# Patient Record
Sex: MALE | Race: WHITE | NOT HISPANIC OR LATINO | Employment: FULL TIME | URBAN - METROPOLITAN AREA
[De-identification: names, ages, dates, MRNs, and addresses within clinical notes are randomized per-mention and may not be internally consistent; named-entity substitution may affect disease eponyms.]

---

## 2017-01-09 ENCOUNTER — ALLSCRIPTS OFFICE VISIT (OUTPATIENT)
Dept: OTHER | Facility: OTHER | Age: 62
End: 2017-01-09

## 2017-01-13 ENCOUNTER — GENERIC CONVERSION - ENCOUNTER (OUTPATIENT)
Dept: OTHER | Facility: OTHER | Age: 62
End: 2017-01-13

## 2017-02-01 DIAGNOSIS — R91.1 SOLITARY PULMONARY NODULE: ICD-10-CM

## 2017-03-13 ENCOUNTER — ALLSCRIPTS OFFICE VISIT (OUTPATIENT)
Dept: OTHER | Facility: OTHER | Age: 62
End: 2017-03-13

## 2017-08-07 ENCOUNTER — ALLSCRIPTS OFFICE VISIT (OUTPATIENT)
Dept: OTHER | Facility: OTHER | Age: 62
End: 2017-08-07

## 2017-08-07 DIAGNOSIS — I10 ESSENTIAL (PRIMARY) HYPERTENSION: ICD-10-CM

## 2017-08-14 ENCOUNTER — HOSPITAL ENCOUNTER (OUTPATIENT)
Dept: CT IMAGING | Facility: CLINIC | Age: 62
Discharge: HOME | End: 2017-08-14
Payer: COMMERCIAL

## 2017-08-14 DIAGNOSIS — R91.1 SOLITARY PULMONARY NODULE: ICD-10-CM

## 2017-08-14 PROCEDURE — 71250 CT THORAX DX C-: CPT

## 2017-08-19 ENCOUNTER — LAB CONVERSION - ENCOUNTER (OUTPATIENT)
Dept: OTHER | Facility: OTHER | Age: 62
End: 2017-08-19

## 2017-08-19 LAB
A/G RATIO (HISTORICAL): 1.4 (CALC) (ref 1–2.5)
A/G RATIO (HISTORICAL): 1.4 (CALC) (ref 1–2.5)
ALBUMIN SERPL BCP-MCNC: 4.1 G/DL (ref 3.6–5.1)
ALBUMIN SERPL BCP-MCNC: 4.1 G/DL (ref 3.6–5.1)
ALP SERPL-CCNC: 73 U/L (ref 40–115)
ALP SERPL-CCNC: 73 U/L (ref 40–115)
ALT SERPL W P-5'-P-CCNC: 12 U/L (ref 9–46)
ALT SERPL W P-5'-P-CCNC: 12 U/L (ref 9–46)
AST SERPL W P-5'-P-CCNC: 18 U/L (ref 10–35)
AST SERPL W P-5'-P-CCNC: 18 U/L (ref 10–35)
BASOPHILS # BLD AUTO: 0.6 %
BASOPHILS # BLD AUTO: 28 CELLS/UL (ref 0–200)
BILIRUB SERPL-MCNC: 1.5 MG/DL (ref 0.2–1.2)
BILIRUB SERPL-MCNC: 1.5 MG/DL (ref 0.2–1.2)
BILIRUBIN DIRECT (HISTORICAL): 0.3 MG/DL
BUN SERPL-MCNC: 25 MG/DL (ref 7–25)
BUN/CREA RATIO (HISTORICAL): ABNORMAL (CALC) (ref 6–22)
CALCIUM SERPL-MCNC: 9.5 MG/DL (ref 8.6–10.3)
CHLORIDE SERPL-SCNC: 96 MMOL/L (ref 98–110)
CHOLEST SERPL-MCNC: 164 MG/DL (ref 125–200)
CHOLEST/HDLC SERPL: 3.2 (CALC)
CO2 SERPL-SCNC: 35 MMOL/L (ref 20–31)
CREAT SERPL-MCNC: 0.9 MG/DL (ref 0.7–1.25)
DEPRECATED RDW RBC AUTO: 12.1 % (ref 11–15)
EGFR AFRICAN AMERICAN (HISTORICAL): 107 ML/MIN/1.73M2
EGFR-AMERICAN CALC (HISTORICAL): 93 ML/MIN/1.73M2
EOSINOPHIL # BLD AUTO: 282 CELLS/UL (ref 15–500)
EOSINOPHIL # BLD AUTO: 6 %
GAMMA GLOBULIN (HISTORICAL): 3 G/DL (CALC) (ref 1.9–3.7)
GAMMA GLOBULIN (HISTORICAL): 3 G/DL (CALC) (ref 1.9–3.7)
GLUCOSE (HISTORICAL): 94 MG/DL (ref 65–99)
HCT VFR BLD AUTO: 43.7 % (ref 38.5–50)
HDLC SERPL-MCNC: 52 MG/DL
HGB BLD-MCNC: 14.8 G/DL (ref 13.2–17.1)
INDIRECT BILIRUBIN (HISTORICAL): 1.2 MG/DL (CALC) (ref 0.2–1.2)
LDL CHOLESTEROL (HISTORICAL): 96 MG/DL (CALC)
LYMPHOCYTES # BLD AUTO: 15.2 %
LYMPHOCYTES # BLD AUTO: 714 CELLS/UL (ref 850–3900)
MCH RBC QN AUTO: 32.2 PG (ref 27–33)
MCHC RBC AUTO-ENTMCNC: 33.9 G/DL (ref 32–36)
MCV RBC AUTO: 95 FL (ref 80–100)
MONOCYTES # BLD AUTO: 564 CELLS/UL (ref 200–950)
MONOCYTES (HISTORICAL): 12 %
NEUTROPHILS # BLD AUTO: 3111 CELLS/UL (ref 1500–7800)
NEUTROPHILS # BLD AUTO: 66.2 %
NON-HDL-CHOL (CHOL-HDL) (HISTORICAL): 112 MG/DL (CALC)
PLATELET # BLD AUTO: 325 THOUSAND/UL (ref 140–400)
PMV BLD AUTO: 9.6 FL (ref 7.5–12.5)
POTASSIUM SERPL-SCNC: 4.4 MMOL/L (ref 3.5–5.3)
RBC # BLD AUTO: 4.6 MILLION/UL (ref 4.2–5.8)
SODIUM SERPL-SCNC: 136 MMOL/L (ref 135–146)
TOTAL PROTEIN (HISTORICAL): 7.1 G/DL (ref 6.1–8.1)
TOTAL PROTEIN (HISTORICAL): 7.1 G/DL (ref 6.1–8.1)
TRIGL SERPL-MCNC: 79 MG/DL
WBC # BLD AUTO: 4.7 THOUSAND/UL (ref 3.8–10.8)

## 2017-08-29 ENCOUNTER — ALLSCRIPTS OFFICE VISIT (OUTPATIENT)
Dept: OTHER | Facility: OTHER | Age: 62
End: 2017-08-29

## 2017-09-02 ENCOUNTER — OFFICE VISIT (OUTPATIENT)
Dept: URGENT CARE | Facility: MEDICAL CENTER | Age: 62
End: 2017-09-02
Payer: COMMERCIAL

## 2017-09-02 PROCEDURE — G0382 LEV 3 HOSP TYPE B ED VISIT: HCPCS

## 2017-09-18 ENCOUNTER — ALLSCRIPTS OFFICE VISIT (OUTPATIENT)
Dept: OTHER | Facility: OTHER | Age: 62
End: 2017-09-18

## 2017-10-04 ENCOUNTER — HOSPITAL ENCOUNTER (EMERGENCY)
Facility: HOSPITAL | Age: 62
Discharge: LEFT BEFORE DISCHARGE | End: 2017-10-04
Attending: EMERGENCY MEDICINE
Payer: COMMERCIAL

## 2017-10-04 ENCOUNTER — APPOINTMENT (EMERGENCY)
Dept: RADIOLOGY | Facility: HOSPITAL | Age: 62
End: 2017-10-04
Payer: COMMERCIAL

## 2017-10-04 VITALS
TEMPERATURE: 98.7 F | RESPIRATION RATE: 22 BRPM | SYSTOLIC BLOOD PRESSURE: 140 MMHG | HEART RATE: 106 BPM | OXYGEN SATURATION: 93 % | WEIGHT: 197.75 LBS | BODY MASS INDEX: 30.07 KG/M2 | DIASTOLIC BLOOD PRESSURE: 78 MMHG

## 2017-10-04 DIAGNOSIS — J44.1 COPD EXACERBATION (HCC): Primary | ICD-10-CM

## 2017-10-04 DIAGNOSIS — R09.02 HYPOXIA: ICD-10-CM

## 2017-10-04 LAB
ANION GAP SERPL CALCULATED.3IONS-SCNC: 2 MMOL/L (ref 4–13)
APTT PPP: 37 SECONDS (ref 23–35)
ATRIAL RATE: 113 BPM
BASOPHILS # BLD AUTO: 0.02 THOUSANDS/ΜL (ref 0–0.1)
BASOPHILS NFR BLD AUTO: 0 % (ref 0–1)
BUN SERPL-MCNC: 13 MG/DL (ref 5–25)
CALCIUM SERPL-MCNC: 9.2 MG/DL (ref 8.3–10.1)
CHLORIDE SERPL-SCNC: 97 MMOL/L (ref 100–108)
CO2 SERPL-SCNC: 36 MMOL/L (ref 21–32)
CREAT SERPL-MCNC: 0.72 MG/DL (ref 0.6–1.3)
EOSINOPHIL # BLD AUTO: 0.07 THOUSAND/ΜL (ref 0–0.61)
EOSINOPHIL NFR BLD AUTO: 1 % (ref 0–6)
ERYTHROCYTE [DISTWIDTH] IN BLOOD BY AUTOMATED COUNT: 12.7 % (ref 11.6–15.1)
GFR SERPL CREATININE-BSD FRML MDRD: 101 ML/MIN/1.73SQ M
GLUCOSE SERPL-MCNC: 120 MG/DL (ref 65–140)
HCT VFR BLD AUTO: 46.8 % (ref 36.5–49.3)
HGB BLD-MCNC: 15.1 G/DL (ref 12–17)
INR PPP: 0.94 (ref 0.86–1.16)
LYMPHOCYTES # BLD AUTO: 0.5 THOUSANDS/ΜL (ref 0.6–4.47)
LYMPHOCYTES NFR BLD AUTO: 8 % (ref 14–44)
MCH RBC QN AUTO: 31.9 PG (ref 26.8–34.3)
MCHC RBC AUTO-ENTMCNC: 32.3 G/DL (ref 31.4–37.4)
MCV RBC AUTO: 99 FL (ref 82–98)
MONOCYTES # BLD AUTO: 0.33 THOUSAND/ΜL (ref 0.17–1.22)
MONOCYTES NFR BLD AUTO: 5 % (ref 4–12)
NEUTROPHILS # BLD AUTO: 5.61 THOUSANDS/ΜL (ref 1.85–7.62)
NEUTS SEG NFR BLD AUTO: 86 % (ref 43–75)
NT-PROBNP SERPL-MCNC: 55 PG/ML
P AXIS: 80 DEGREES
PLATELET # BLD AUTO: 253 THOUSANDS/UL (ref 149–390)
PMV BLD AUTO: 9.6 FL (ref 8.9–12.7)
POTASSIUM SERPL-SCNC: 4.3 MMOL/L (ref 3.5–5.3)
PR INTERVAL: 146 MS
PROTHROMBIN TIME: 12.8 SECONDS (ref 12.1–14.4)
QRS AXIS: 84 DEGREES
QRSD INTERVAL: 82 MS
QT INTERVAL: 322 MS
QTC INTERVAL: 434 MS
RBC # BLD AUTO: 4.73 MILLION/UL (ref 3.88–5.62)
SODIUM SERPL-SCNC: 135 MMOL/L (ref 136–145)
T WAVE AXIS: 71 DEGREES
TROPONIN I SERPL-MCNC: 0.02 NG/ML
VENTRICULAR RATE: 109 BPM
WBC # BLD AUTO: 6.53 THOUSAND/UL (ref 4.31–10.16)

## 2017-10-04 PROCEDURE — 94760 N-INVAS EAR/PLS OXIMETRY 1: CPT

## 2017-10-04 PROCEDURE — 93005 ELECTROCARDIOGRAM TRACING: CPT | Performed by: EMERGENCY MEDICINE

## 2017-10-04 PROCEDURE — 96360 HYDRATION IV INFUSION INIT: CPT

## 2017-10-04 PROCEDURE — 83880 ASSAY OF NATRIURETIC PEPTIDE: CPT | Performed by: EMERGENCY MEDICINE

## 2017-10-04 PROCEDURE — 96361 HYDRATE IV INFUSION ADD-ON: CPT

## 2017-10-04 PROCEDURE — 85025 COMPLETE CBC W/AUTO DIFF WBC: CPT | Performed by: EMERGENCY MEDICINE

## 2017-10-04 PROCEDURE — 71010 HB CHEST X-RAY 1 VIEW FRONTAL: CPT

## 2017-10-04 PROCEDURE — 36415 COLL VENOUS BLD VENIPUNCTURE: CPT | Performed by: EMERGENCY MEDICINE

## 2017-10-04 PROCEDURE — 85610 PROTHROMBIN TIME: CPT | Performed by: EMERGENCY MEDICINE

## 2017-10-04 PROCEDURE — 80048 BASIC METABOLIC PNL TOTAL CA: CPT | Performed by: EMERGENCY MEDICINE

## 2017-10-04 PROCEDURE — 94644 CONT INHLJ TX 1ST HOUR: CPT

## 2017-10-04 PROCEDURE — 84484 ASSAY OF TROPONIN QUANT: CPT | Performed by: EMERGENCY MEDICINE

## 2017-10-04 PROCEDURE — 99285 EMERGENCY DEPT VISIT HI MDM: CPT

## 2017-10-04 PROCEDURE — 85730 THROMBOPLASTIN TIME PARTIAL: CPT | Performed by: EMERGENCY MEDICINE

## 2017-10-04 RX ORDER — PREDNISONE 20 MG/1
60 TABLET ORAL DAILY
Qty: 15 TABLET | Refills: 0 | Status: SHIPPED | OUTPATIENT
Start: 2017-10-04 | End: 2017-10-09

## 2017-10-04 RX ADMIN — ALBUTEROL SULFATE 10 MG: 2.5 SOLUTION RESPIRATORY (INHALATION) at 16:02

## 2017-10-04 RX ADMIN — IPRATROPIUM BROMIDE 1 MG: 0.5 SOLUTION RESPIRATORY (INHALATION) at 16:02

## 2017-10-04 RX ADMIN — SODIUM CHLORIDE 1000 ML: 0.9 INJECTION, SOLUTION INTRAVENOUS at 16:11

## 2017-10-04 NOTE — ED PROVIDER NOTES
History  Chief Complaint   Patient presents with    Shortness of Breath     pt with hx of COPD worsening symptoms  EMS arrived pt was in distress, labored, hypoxic at 78% on RA  Arrives here on CPAP  Total 7 5 mg albuterol given via CPAP and 125 mg Solumedrol  History provided by:  Patient  Shortness of Breath   Severity:  Moderate  Onset quality:  Gradual  Timing:  Constant  Progression:  Worsening  Chronicity:  New  Context: activity    Relieved by:  Nothing  Worsened by: Activity, deep breathing, eating, exertion, movement and coughing  Ineffective treatments:  None tried (Started on CPAP by EMS  Noted with severe hypoxia on room air on initial vital signs )  Associated symptoms: wheezing    Associated symptoms: no abdominal pain, no chest pain, no cough, no fever, no headaches, no rash and no sore throat        Prior to Admission Medications   Prescriptions Last Dose Informant Patient Reported? Taking?    albuterol (PROVENTIL HFA,VENTOLIN HFA) 90 mcg/act inhaler   Yes No   Sig: Inhale 2 puffs every 6 (six) hours as needed for wheezing   aspirin (ECOTRIN LOW STRENGTH) 81 mg EC tablet   No No   Sig: Take 1 tablet by mouth daily for 30 days   furosemide (LASIX) 40 mg tablet   No No   Sig: Take 1 tablet by mouth daily for 30 days   lisinopril (ZESTRIL) 20 mg tablet   No No   Sig: Take 1 tablet by mouth daily for 30 days   potassium chloride (K-DUR,KLOR-CON) 10 mEq tablet   No No   Sig: Take 1 tablet by mouth daily for 30 days   predniSONE 10 mg tablet   No No   Si mg by mouth daily for 3 days, then 20 mg by mouth daily for 3 days, then 10 mg by mouth daily for 3 days, then stop   tiotropium (SPIRIVA) 18 mcg inhalation capsule   No No   Sig: Place 1 capsule into inhaler and inhale daily for 30 days   varenicline (CHANTIX) 0 5 mg tablet   No No   Sig: Take 1 tablet by mouth daily for 30 days      Facility-Administered Medications: None       Past Medical History:   Diagnosis Date    COPD (chronic obstructive pulmonary disease) ([facility] [address] )        History reviewed  No pertinent surgical history  History reviewed  No pertinent family history  I have reviewed and agree with the history as documented  Social History   Substance Use Topics    Smoking status: Former Smoker     Packs/day: 1 00    Smokeless tobacco: Former User    Alcohol use 12 0 oz/week     20 Cans of beer per week        Review of Systems   Constitutional: Negative for chills and fever  HENT: Negative for rhinorrhea, sore throat and trouble swallowing  Eyes: Negative for pain  Respiratory: Positive for shortness of breath and wheezing  Negative for cough and stridor  Cardiovascular: Negative for chest pain and leg swelling  Gastrointestinal: Negative for abdominal pain, diarrhea and nausea  Endocrine: Negative for polyuria  Genitourinary: Negative for dysuria, flank pain and urgency  Musculoskeletal: Negative for joint swelling, myalgias and neck stiffness  Skin: Negative for rash  Allergic/Immunologic: Negative for immunocompromised state  Neurological: Negative for dizziness, syncope, weakness, numbness and headaches  Psychiatric/Behavioral: Negative for confusion and suicidal ideas  All other systems reviewed and are negative  Physical Exam  ED Triage Vitals [10/04/17 1504]   Temperature Pulse Respirations Blood Pressure SpO2   98 7 °F (37 1 °C) (!) 109 (!) 24 154/86 100 %      Temp Source Heart Rate Source Patient Position - Orthostatic VS BP Location FiO2 (%)   Axillary Monitor Sitting Right arm --      Pain Score       No Pain           Physical Exam   Constitutional: He is oriented to person, place, and time  He appears well-developed and well-nourished  HENT:   Head: Normocephalic and atraumatic  Eyes: EOM are normal  Pupils are equal, round, and reactive to light  Neck: Normal range of motion  Neck supple  Cardiovascular: Normal rate and regular rhythm  Exam reveals no friction rub      No murmur heard  Pulmonary/Chest: Tachypnea noted  He is in respiratory distress  He has decreased breath sounds (Throughout all lung fields)  He has wheezes in the right upper field, the right middle field, the right lower field, the left upper field, the left middle field and the left lower field  He has no rales  Abdominal: Soft  Bowel sounds are normal  He exhibits no distension  There is no tenderness  Musculoskeletal: Normal range of motion  He exhibits no edema or tenderness  Neurological: He is alert and oriented to person, place, and time  Skin: Skin is warm  No rash noted  Psychiatric: He has a normal mood and affect  Nursing note and vitals reviewed        ED Medications  Medications    EMS REPLENISHMENT MED (not administered)   albuterol CONTINUOUS nebulizing solution (10 mg Nebulization Given 10/4/17 1602)   ipratropium (ATROVENT) 0 02 % inhalation solution 1 mg (1 mg Nebulization Given 10/4/17 1602)   sodium chloride 0 9 % bolus 1,000 mL (1,000 mL Intravenous New Bag 10/4/17 1611)       Diagnostic Studies  Labs Reviewed   CBC AND DIFFERENTIAL - Abnormal        Result Value Ref Range Status    MCV 99 (*) 82 - 98 fL Final    Neutrophils Relative 86 (*) 43 - 75 % Final    Lymphocytes Relative 8 (*) 14 - 44 % Final    Lymphocytes Absolute 0 50 (*) 0 60 - 4 47 Thousands/µL Final    WBC 6 53  4 31 - 10 16 Thousand/uL Final    RBC 4 73  3 88 - 5 62 Million/uL Final    Hemoglobin 15 1  12 0 - 17 0 g/dL Final    Hematocrit 46 8  36 5 - 49 3 % Final    MCH 31 9  26 8 - 34 3 pg Final    MCHC 32 3  31 4 - 37 4 g/dL Final    RDW 12 7  11 6 - 15 1 % Final    MPV 9 6  8 9 - 12 7 fL Final    Platelets 687  224 - 390 Thousands/uL Final    Monocytes Relative 5  4 - 12 % Final    Eosinophils Relative 1  0 - 6 % Final    Basophils Relative 0  0 - 1 % Final    Neutrophils Absolute 5 61  1 85 - 7 62 Thousands/µL Final    Monocytes Absolute 0 33  0 17 - 1 22 Thousand/µL Final    Eosinophils Absolute 0 07  0 00 - 0 61 Thousand/µL Final    Basophils Absolute 0 02  0 00 - 0 10 Thousands/µL Final   APTT - Abnormal     PTT 37 (*) 23 - 35 seconds Final    Narrative: Therapeutic Heparin Range = 60-90 seconds   BASIC METABOLIC PANEL - Abnormal     Sodium 135 (*) 136 - 145 mmol/L Final    Chloride 97 (*) 100 - 108 mmol/L Final    CO2 36 (*) 21 - 32 mmol/L Final    Anion Gap 2 (*) 4 - 13 mmol/L Final    Potassium 4 3  3 5 - 5 3 mmol/L Final    BUN 13  5 - 25 mg/dL Final    Creatinine 0 72  0 60 - 1 30 mg/dL Final    Comment: Standardized to IDMS reference method    Glucose 120  65 - 140 mg/dL Final    Comment:   If the patient is fasting, the ADA then defines impaired fasting glucose as > 100 mg/dL and diabetes as > or equal to 123 mg/dL  Specimen collection should occur prior to Sulfasalazine administration due to the potential for falsely depressed results  Specimen collection should occur prior to Sulfapyridine administration due to the potential for falsely elevated results  Calcium 9 2  8 3 - 10 1 mg/dL Final    eGFR 101  ml/min/1 73sq m Final    Narrative:     National Kidney Disease Education Program recommendations are as follows:  GFR calculation is accurate only with a steady state creatinine  Chronic Kidney disease less than 60 ml/min/1 73 sq  meters  Kidney failure less than 15 ml/min/1 73 sq  meters  PROTIME-INR - Normal    Protime 12 8  12 1 - 14 4 seconds Final    INR 0 94  0 86 - 1 16 Final   TROPONIN I - Normal    Troponin I 0 02  <=0 04 ng/mL Final    Narrative:     [employer] Chemistry analyzer 99% cutoff is > 0 04 ng/mL in network labs    o cTnI 99% cutoff is useful only when applied to patients in the clinical setting of myocardial ischemia  o cTnI 99% cutoff should be interpreted in the context of clinical history, ECG findings and possibly cardiac imaging to establish correct diagnosis    o cTnI 99% cutoff may be suggestive but clearly not indicative of a coronary event without the clinical setting of myocardial ischemia  NT-BNP PRO (BRAIN NATRIURETIC PEPTIDE) - Normal    NT-proBNP 55  <125 pg/mL Final       XR chest 1 view portable   Final Result      No active pulmonary disease  Workstation performed: [id number]             Procedures  CriticalCare Time  Performed by: Sylvester Hawkins  Authorized by: Sylvester Hawkins     Critical care provider statement:     Critical care time (minutes):  85    Critical care time was exclusive of:  Separately billable procedures and treating other patients and teaching time    Critical care was necessary to treat or prevent imminent or life-threatening deterioration of the following conditions:  Respiratory failure (Acute respiratory distress  Requiring O2 supplementation and oxygen  Initially presented on CPAP )    Critical care was time spent personally by me on the following activities:  Blood draw for specimens, obtaining history from patient or surrogate, development of treatment plan with patient or surrogate, evaluation of patient's response to treatment, examination of patient, ordering and performing treatments and interventions, ordering and review of laboratory studies, ordering and review of radiographic studies, re-evaluation of patient's condition and review of old charts          Phone Contacts  ED Phone Contact    ED Course  ED Course                                MDM  Number of Diagnoses or Management Options  COPD exacerbation ([facility] [address] ): new and requires workup  Hypoxia: new and requires workup  Diagnosis management comments: 31-year-old with respiratory distress secondary to COPD exacerbation increased decreased breath sounds and wheezing  Solu-Medrol given will given hour long nebulizer treatment  Re-evaluate  5:57 PM   Capacity Assessment:   Oriented to person, place, and time  Gives appropriate answers and rational explanation of refusal of care  Speaks coherently   No signs of psychosis, auditory hallucinations, delusional thinking, suicidal ideations or slurred speech  No tangential thinking, visual hallucinations or homicidal ideations  Abstract thinking intact  No indication for involuntary commitment is present  Clinical Impression: the patient has the capacity to make decisions regarding the medical care offered  Relevant issues reviewed and discussed with the patient and family  Aware of suspected diagnosis suggested by screening exam  The suspected diagnosis, based upon the initiated medical screening exam, has been discussed with the patient and family  Acknowledges understanding of the reasons for recommendations regarding medical treatment, medical tests, procedures, admission to facility and further observation  The recommended medical care being refused has been discussed with the patient and family  The risks of refusing recommended care that were disclosed are death, quadriplegia, paraplegia, permanent mental impairment, loss of limb, loss of sexual function and loss of current lifestyle  Discharge instructions were provided to the patient  Patient noted with severe hypoxia while ambulating dip down to 85%  Will place on steroids tried to convince the patient as much as possible to stay for inpatient evaluation and management  Does not want stay  Amount and/or Complexity of Data Reviewed  Clinical lab tests: reviewed and ordered  Tests in the radiology section of CPT®: ordered and reviewed  Review and summarize past medical records: yes      CritCare Time    Disposition  Final diagnoses:   COPD exacerbation [facility])   Hypoxia     ED Disposition     ED Disposition Condition Comment    AMA  Date: 10/4/2017  Patient: Ninetta Duverney  Admitted: 10/4/2017  3:03 PM  Attending Provider: Margaretta Leos, DO Ninetta Duverney or his authorized caregiver has made the decision for the patient to leave the emergency department against the advic e of his attending physician   He or his authorized caregiver has been informed and understands the inherent risks, including death, respiratory distress, respiratory failure,  He or his authorized caregiver has decided to accept the responsibility for t his decision  Jesus Escobar and all necessary parties have been advised that he may return for further evaluation or treatment  His condition at time of discharge was guarded   Jesus Escobar had current vital signs as follows:  /75   [address]   Temp 98 7 °F (37 1 °C) (Axillary)   Resp (!) 24   Wt 89 7 kg (197 lb 12 oz)         Follow-up Information     Follow up With Specialties Details Why Contact Info    Daxa Schwab MD Internal Medicine Call in 2 days If symptoms worsen [address]  [phone]          Patient's Medications   Discharge Prescriptions    PREDNISONE 20 MG TABLET    Take 3 tablets by mouth daily for 5 days       Start Date: 10/4/2017 End Date: 10/9/2017       Order Dose: 60 mg       Quantity: 15 tablet    Refills: 0     No discharge procedures on file      ED Provider  Electronically Signed by       Xiao Nguyen DO  10/04/17 0395

## 2017-10-04 NOTE — ED NOTES
Pt awake and alert, no distress noted, no other questions upon d/c     April M Melissa Holden RN  10/04/17 6031

## 2017-10-04 NOTE — DISCHARGE INSTRUCTIONS
COPD (Chronic Obstructive Pulmonary Disease)   WHAT YOU NEED TO KNOW:   Chronic obstructive pulmonary disease (COPD) is a lung disease that makes it hard for you to breathe  It is usually a result of lung damage caused by years of irritation and inflammation in your lungs  DISCHARGE INSTRUCTIONS:   Call 911 if:   · You feel lightheaded, short of breath, and have chest pain  Return to the emergency department if:   · You are confused, dizzy, or feel faint  · Your arm or leg feels warm, tender, and painful  It may look swollen and red  · You cough up blood  Contact your healthcare provider if:   · You have more shortness of breath than usual      · You need more medicine than usual to control your symptoms  · You are coughing or wheezing more than usual      · You are coughing up more mucus, or it is a different color or has a different odor  · You gain more than 3 pounds in a week  · You have a fever, a runny or stuffy nose, and a sore throat, or other cold or flu symptoms  · Your skin, lips, or nails start to turn blue  · You have swelling in your legs or ankles  · You are very tired or weak for more than a day  · You notice changes in your mood, or changes in your ability to think or concentrate  · You have questions or concerns about your condition or care  Medicines:   · Medicines  may be used to open your airways, decrease swelling and inflammation in your lungs, or treat an infection  You may need 2 or more medicines  A short-acting medicine relieves symptoms quickly  Long-acting medicines will control or prevent symptoms  Ask for more information about the medicines you are given and how to use them safely  · Take your medicine as directed  Contact your healthcare provider if you think your medicine is not helping or if you have side effects  Tell him or her if you are allergic to any medicine  Keep a list of the medicines, vitamins, and herbs you take  Include the amounts, and when and why you take them  Bring the list or the pill bottles to follow-up visits  Carry your medicine list with you in case of an emergency  Help make breathing easier:   · Use pursed-lip breathing any time you feel short of breath  Take a deep breath in through your nose  Slowly breathe out through your mouth with your lips pursed for twice as long as you inhaled  You can also practice this breathing pattern while you bend, lift, climb stairs, or exercise  It slows down your breathing and helps move more air in and out of your lungs  · Do not smoke, and avoid others who smoke  Nicotine and other substances can cause lung irritation or damage and make it harder for you to breathe  Do not use e-cigarettes or smokeless tobacco  They still contain nicotine  Ask your healthcare provider for information if you currently smoke and need help to quit  For support and more information:  ¨ [employer]  Phone: 9- 073 - 910-5292  Web Address: [employer]      · Be aware of and avoid anything that makes your symptoms worse  Stay out of high altitudes and places with high humidity  Stay inside, or cover your mouth and nose with a scarf when you are outside during cold weather  Stay inside on days when air pollution or pollen counts are high  Do not use aerosol sprays such as deodorant, bug spray, and hair spray  Manage COPD and help prevent exacerbations:  COPD is a serious condition that gets worse over time  A COPD exacerbation means your symptoms suddenly get worse  It is important to prevent exacerbations  An exacerbation can cause more lung damage  COPD cannot be cured, but you can take action to feel better and prevent COPD exacerbations:  · Protect yourself from germs  Germs can get into your lungs and cause an infection  An infection in your lungs can create more mucus and make it harder to breathe   An infection can also create swelling in your airways and prevent air from getting in  You can decrease your risk for infection by doing the following:     [facility] your hands often with soap and water  Carry germ-killing gel with you  You can use the gel to clean your hands when soap and water are not available  ¨ Do not touch your eyes, nose, or mouth unless you have washed your hands first      ¨ Always cover your mouth when you cough  Cough into a tissue or your shirtsleeve so you do not spread germs from your hands  ¨ Try to avoid people who have a cold or the flu  If you are sick, stay away from others as much as possible  · Drink more liquids  This will help to keep your air passages moist and help you cough up mucus  Ask how much liquid to drink each day and which liquids are best for you  · Exercise daily  Exercise for at least 20 minutes each day to help increase your energy and decrease shortness of breath  Walking or riding a bike are good ways to exercise  Talk to your healthcare provider about the best exercise plan for you  · Ask about vaccines  Your healthcare provider may recommend that you get regular flu and pneumonia vaccines  Pneumonia can become life-threatening for a person who has COPD  Ask about other vaccines you may need  Ask your healthcare provider about the flu and pneumonia vaccines  All adults should get the flu (influenza) vaccine every year as soon as it becomes available  The pneumonia vaccine is given to adults aged 72 or older to prevent pneumococcal disease, such as pneumonia  Adults aged 23 to 59 years who are at high risk for pneumococcal disease also should get the pneumococcal vaccine  It may need to be repeated 1 or 5 years later  Pulmonary rehabilitation:  Your healthcare provider may recommend a program to help you manage your symptoms and improve your quality of life  It may include nutritional counseling and exercise to strengthen your lungs     Make decisions about your choices for future treatment:  Ask for information about advanced medical directives and living brower  These documents help you decide and write down your choices for treatment and end-of-life care  It is best to complete them when you feel well and can think clearly about your wishes  The information can then be kept for future use if you are in the hospital or become very ill  Follow up with your healthcare provider as directed: You may need more tests  Your healthcare provider may refer you to a pulmonary (lung) specialist  Write down your questions so you remember to ask them during your visits  © 2017 [address] [facility] Information is for End User's use only and may not be sold, redistributed or otherwise used for commercial purposes  All illustrations and images included in CareNotes® are the copyrighted property of A D A M , Inc  or Tommy Blount  The above information is an  only  It is not intended as medical advice for individual conditions or treatments  Talk to your doctor, nurse or pharmacist before following any medical regimen to see if it is safe and effective for you  COPD (Chronic Obstructive Pulmonary Disease)   WHAT YOU NEED TO KNOW:   Chronic obstructive pulmonary disease (COPD) is a lung disease that makes it hard for you to breathe  It is usually a result of lung damage caused by years of irritation and inflammation in your lungs  DISCHARGE INSTRUCTIONS:   Call 911 if:   · You feel lightheaded, short of breath, and have chest pain  Seek care immediately if:   · You are confused, dizzy, or feel faint  · Your arm or leg feels warm, tender, and painful  It may look swollen and red  · You cough up blood  Contact your healthcare provider if:   · You have more shortness of breath than usual      · You need more medicine than usual to control your symptoms       · You are coughing or wheezing more than usual      · You are coughing up more mucus, or it is a different color or has a different odor  · You gain more than 3 pounds in a week  · You have a fever, a runny or stuffy nose, and a sore throat, or other cold or flu symptoms  · Your skin, lips, or nails start to turn blue  · You have swelling in your legs or ankles  · You are very tired or weak for more than a day  · You notice changes in your mood, or changes in your ability to think or concentrate  · You have questions or concerns about your condition or care  Medicines:   · Medicines  may be used to open your airways, decrease swelling and inflammation in your lungs, or treat an infection  You may need 2 or more medicines  A short-acting medicine relieves symptoms quickly  Long-acting medicines will control or prevent symptoms  Ask for more information about the medicines you are given and how to use them safely  · Take your medicine as directed  Contact your healthcare provider if you think your medicine is not helping or if you have side effects  Tell him or her if you are allergic to any medicine  Keep a list of the medicines, vitamins, and herbs you take  Include the amounts, and when and why you take them  Bring the list or the pill bottles to follow-up visits  Carry your medicine list with you in case of an emergency  Help make breathing easier:   · Use pursed-lip breathing any time you feel short of breath  Take a deep breath in through your nose  Slowly breathe out through your mouth with your lips pursed for twice as long as you inhaled  You can also practice this breathing pattern while you bend, lift, climb stairs, or exercise  It slows down your breathing and helps move more air in and out of your lungs  · Do not smoke, and avoid others who smoke  Nicotine and other substances can cause lung irritation or damage and make it harder for you to breathe  Do not use e-cigarettes or smokeless tobacco  They still contain nicotine   Ask your healthcare provider for information if you currently smoke and need help to quit  For support and more information:  [employer]  Phone: 8- 746 - 399-4844  Web Address: [employer]      · Be aware of and avoid anything that makes your symptoms worse  Stay out of high altitudes and places with high humidity  Stay inside, or cover your mouth and nose with a scarf when you are outside during cold weather  Stay inside on days when air pollution or pollen counts are high  Do not use aerosol sprays such as deodorant, bug spray, and hair spray  Manage COPD and help prevent exacerbations:  COPD is a serious condition that gets worse over time  A COPD exacerbation means your symptoms suddenly get worse  It is important to prevent exacerbations  An exacerbation can cause more lung damage  COPD cannot be cured, but you can take action to feel better and prevent COPD exacerbations:  · Protect yourself from germs  Germs can get into your lungs and cause an infection  An infection in your lungs can create more mucus and make it harder to breathe  An infection can also create swelling in your airways and prevent air from getting in  You can decrease your risk for infection by doing the following:     [facility] your hands often with soap and water  Carry germ-killing gel with you  You can use the gel to clean your hands when soap and water are not available  ¨ Do not touch your eyes, nose, or mouth unless you have washed your hands first      ¨ Always cover your mouth when you cough  Cough into a tissue or your shirtsleeve so you do not spread germs from your hands  ¨ Try to avoid people who have a cold or the flu  If you are sick, stay away from others as much as possible  · Drink more liquids  This will help to keep your air passages moist and help you cough up mucus  Ask how much liquid to drink each day and which liquids are best for you  · Exercise daily    Exercise for at least 20 minutes each day to help increase your energy and decrease shortness of breath  Walking or riding a bike are good ways to exercise  Talk to your healthcare provider about the best exercise plan for you  · Ask about vaccines  Your healthcare provider may recommend that you get regular flu and pneumonia vaccines  Pneumonia can become life-threatening for a person who has COPD  Ask about other vaccines you may need  Ask your healthcare provider about the flu and pneumonia vaccines  All adults should get the flu (influenza) vaccine every year as soon as it becomes available  The pneumonia vaccine is given to adults aged 72 or older to prevent pneumococcal disease, such as pneumonia  Adults aged 23 to 59 years who are at high risk for pneumococcal disease also should get the pneumococcal vaccine  It may need to be repeated 1 or 5 years later  Pulmonary rehabilitation:  Your healthcare provider may recommend a program to help you manage your symptoms and improve your quality of life  It may include nutritional counseling and exercise to strengthen your lungs  Make decisions about your choices for future treatment:  Ask for information about advanced medical directives and living brower  These documents help you decide and write down your choices for treatment and end-of-life care  It is best to complete them when you feel well and can think clearly about your wishes  The information can then be kept for future use if you are in the hospital or become very ill  Follow up with your healthcare provider as directed: You may need more tests  Your healthcare provider may refer you to a pulmonary (lung) specialist  Write down your questions so you remember to ask them during your visits  © 2017 [address] [employer] Information is for End User's use only and may not be sold, redistributed or otherwise used for commercial purposes  All illustrations and images included in CareNotes® are the copyrighted property of [employer]  or Tommy Blount    The above information is an  only  It is not intended as medical advice for individual conditions or treatments  Talk to your doctor, nurse or pharmacist before following any medical regimen to see if it is safe and effective for you

## 2017-10-04 NOTE — ED PROCEDURE NOTE
PROCEDURE  ECG 12 Lead Documentation  Date/Time: 10/4/2017 3:29 PM  Performed by: Yesi Stauffer by: Deena Urban     Comments:      Sinus tachycardia otherwise unremarkable ECG no ST segment elevations or depressions  No signs of acute ischemia  Rate of 109

## 2017-10-24 ENCOUNTER — ALLSCRIPTS OFFICE VISIT (OUTPATIENT)
Dept: OTHER | Facility: OTHER | Age: 62
End: 2017-10-24

## 2017-10-25 NOTE — PROGRESS NOTES
Assessment  1  COPD with acute exacerbation (491 21) (J44 1)    Plan  COPD (chronic obstructive pulmonary disease)    · Ipratropium-Albuterol 0 5-2 5 (3) MG/3ML Inhalation Solution; USE 1 UNIT DOSE IN  NEBULIZER EVERY 4 HOURS AS NEEDED   · LevoFLOXacin 500 MG Oral Tablet (Levaquin); TAKE 1 TABLET DAILY   · PredniSONE 10 MG Oral Tablet; take 3 tabs daily x 2 days then 2 tabs daily x 2  days then 1 tab x 1 day   · Respiratory Equipment Nebulizer; Status:Complete;   Done: 83AHC6127    Discussion/Summary    Exacerbation COPD reviewed chest x-ray from ER which showed no active disease with a normal BNP and therefore we will hold his Spiriva start duoneb 3-4 times a day start prednisone taper and Levaquin the Levaquin is twofold for acute bronchitis and otitis media with possible rupture  He will follow up 10-14 days  media as above  Chief Complaint  Shortness of breath and cough      History of Present Illness  HPI: Patient with history of COPD had been fairly stable over the past one year until about 2 weeks ago when he started developing a heaviness in his chest shortness of breath and cough  He went to the emergency room he was diagnosed with exacerbation COPD  He was placed on prednisone he felt a little bit better but not significantly  is currently using 4 L of oxygen  He was evaluated for portable oxygen but failed because of his need for 4 L is using Spiriva and using his rescue inhaler as needednot have nebulizer at homenot smoked in over a year      Review of Systems    Constitutional: no fever or chills, feels well, no tiredness, no recent weight loss or weight gain  ENT: no complaints of earache, no loss of hearing, no nosebleeds or nasal discharge, no sore throat or hoarseness  Cardiovascular: no complaints of slow or fast heart rate, no chest pain, no palpitations, no leg claudication or lower extremity edema  Respiratory: as noted in HPI     Gastrointestinal: no complaints of abdominal pain, no constipation, no nausea or vomiting, no diarrhea or bloody stools  Genitourinary: no complaints of dysuria or incontinence, no hesitancy, no nocturia, no genital lesion, no inadequacy of penile erection  Musculoskeletal: no complaints of arthralgia, no myalgia, no joint swelling or stiffness, no limb pain or swelling  Integumentary: no complaints of skin rash or lesion, no itching or dry skin, no skin wounds  Neurological: no complaints of headache, no confusion, no numbness or tingling, no dizziness or fainting  Active Problems  1  Atopic dermatitis (691 8) (L20 9)   2  Breath shortness (786 05) (R06 02)   3  Colon cancer screening (V76 51) (Z12 11)   4  COPD (chronic obstructive pulmonary disease) (496) (J44 9)   5  Dermatitis (692 9) (L30 9)   6  Hypertension (401 9) (I10)   7  Limb swelling (729 81) (M79 89)   8  Lung nodule seen on imaging study (793 11) (R91 1)   9  Need for influenza vaccination (V04 81) (Z23)   10  Need for pneumococcal vaccine (V03 82) (Z23)   11  Nicotine dependence (305 1) (F17 200)   12  Preop examination (V72 84) (Z01 818)   13  Prostate cancer screening (V76 44) (Z12 5)   14  Pulmonary hypertension (416 8) (I27 20)   15  Screening for skin condition (V82 0) (Z13 89)   16  Tobacco use (305 1) (Z72 0)    Past Medical History  Active Problems And Past Medical History Reviewed: The active problems and past medical history were reviewed and updated today  Surgical History  Surgical History Reviewed: The surgical history was reviewed and updated today  Social History   · Alcohol Use (History)   · CONSUMES ALCOHOL EVERY SATURDAY   · Consumes alcohol (V49 89) (Z78 9)   · Current Every Day Smoker (305 1)   · Drug Use (305 90)   · FORMERLY USED COCAINE REGURALY QUIT IN HIS 20S   · Former tobacco use (V15 82) (W04 776)   · Tobacco use (305 1) (Z72 0)  The social history was reviewed and updated today  Family History  Family History Reviewed:    The family history was reviewed and updated today  Current Meds   1  Betamethasone Dipropionate Aug 0 05 % External Ointment; apply sparingly to affected   area(s) twice daily; Therapy: 46Uwx4421 to (Last Rx:26Ski1060)  Requested for: 87Epx6969 Ordered   2  Kenalog 40 MG/ML Injection Suspension; INJECT 1 ML INTRAMUSCULARLY ONCE; To   Be Done: 28BCM1470; Status: HOLD FOR - Administration Ordered   3  Lisinopril 20 MG Oral Tablet; take 1 tablet by mouth daily; Therapy: 93JBY6030 to ([id number])  Requested for: 98PTD9107; Last   Rx:89Jms3453 Ordered   4  PredniSONE 10 MG Oral Tablet; Therapy: (Recorded:85Hvp7073) to Recorded   5  ProAir  (90 Base) MCG/ACT Inhalation Aerosol Solution; INHALE TWO PUFFS BY   MOUTH EVERY 4 HOURS AS NEEDED; Therapy: 44XWK3012 to (Bettyjo Manner)  Requested for: 88Dyx4828; Last   Rx:98Fet3240 Ordered   6  Spiriva HandiHaler 18 MCG Inhalation Capsule; INHALE ONE CAPSULE VIA   HANDIHALER EVERY DAY; Therapy: 19KZX4901 to (Evaluate:55Mrb4784)  Requested for: 58Ydn2124; Last   Rx:90Kzf2071 Ordered   7  Triamcinolone Acetonide 0 1 % External Ointment; APPLY AND GENTLY MASSAGE INTO   AFFECTED AREA(S) TWICE DAILY; Therapy: 40DRU7020 to (Last Rx:60Kdy4382)  Requested for: 11Jox0303 Ordered    The medication list was reviewed and updated today  Allergies  1  Breo Ellipta AEPB  2  Latex    Vitals   Recorded: 40PTQ6685 01:42PM   Heart Rate 322   Systolic 716   Diastolic 64   Height 5 ft 8 in   Weight 160 lb    BMI Calculated 24 33   BSA Calculated 1 86   O2 Saturation 97     Physical Exam    Constitutional   General appearance: No acute distress, well appearing and well nourished  Ears, Nose, Mouth, and Throat   Otoscopic examination: Abnormal  -- Yellow exudative material noted to left TM unable to visualize TM as well  Oropharynx: Normal with no erythema, edema, exudate or lesions      Pulmonary   Respiratory effort: No increased work of breathing or signs of respiratory distress  Auscultation of lungs: Abnormal  -- Prolonged expiratory phase  Cardiovascular   Auscultation of heart: Normal rate and rhythm, normal S1 and S2, without murmurs  Future Appointments    Date/Time Provider Specialty Site   11/07/2017 02:45 PM [facility]   02/21/2018 04:00 PM JACEY yRan   Internal Medicine [facility]     Signatures   Electronically signed by : Trevon Canales ; Oct 24 2017  3:13PM EST                       (Author)    Electronically signed by : JACEY Granados ; Oct 24 2017  6:50PM EST

## 2017-11-07 ENCOUNTER — GENERIC CONVERSION - ENCOUNTER (OUTPATIENT)
Dept: OTHER | Facility: OTHER | Age: 62
End: 2017-11-07

## 2017-11-27 ENCOUNTER — GENERIC CONVERSION - ENCOUNTER (OUTPATIENT)
Dept: INTERNAL MEDICINE CLINIC | Facility: CLINIC | Age: 62
End: 2017-11-27

## 2018-01-09 NOTE — RESULT NOTES
PFT Results v2:   Diagnosis/Reason For Study: COPD pulmonary hypertension   Referring Provider: Dr Sadi Fulton   Spirometry: Forced vital capacity: 3 18L and 76% Predicted Values  Forced expiratory volume in one second: 1 54L and 48% Predicted Value  FEV1/FVC ratio is 63% Predicted Values  Post Bronchodilator Spirometry: Forced vital capacity : 3 27L and 78% Predicted Values  Forced expiratory volume in one second : 1 66L and 52% Predicted Value  FEV1/FVC ratio is 66% Predicted Values  Lung Volumes: Total lung capacity : 7 80L and 124% Predicted Values  RV: 213% Predicted Values  RV/T% Predicted Values  DLCO:   DLCO 60% Predicted Values  PFT Interpretation:   Patient had a full lung function testing with spirometry lung volumes and DLCO  Patient gave a good effort  Results meet the ATS standards for acceptability and repeatability  The flow volume curve demonstrates an obstructive pattern  There is evidence of moderate obstructive ventilatory limitation with no appreciable response to the bronchodilator  The lung volumes are increased  The residual volume is increased consistent with hyperinflation  The DLCO is moderately decreased  Findings are consistent with COPD emphysema  Clinical correlation is required        Future Appointments    Date/Time Provider Specialty Site   2016 09:30 AM Trevon Sena  Internal Medicine [facility]    2016 10:30 AM Douglas Alcaraz [facility] Pulmonary Medicine [facility] Armin Osborne [facility] [address]      Electronically signed by : JACEY Mcclendon ; Dec  2 2016  1:37PM EST                       (Author)

## 2018-01-10 NOTE — PROGRESS NOTES
History of Present Illness  Care Coordination Encounter Information:   Type of Encounter: Telephonic    Spoke to Other   [facility]  Care Coordination SL Nurse [facility]:   The reason for call is to discuss outreach for follow up/needed services  Attempted to contact patient in regards to status of health x3  Did not receive phone call back  Will send letter with contact information in case patient may have any questions or concerns in the future  Active Problems    1  Atopic dermatitis (691 8) (L20 9)   2  Breath shortness (786 05) (R06 02)   3  Colon cancer screening (V76 51) (Z12 11)   4  COPD (chronic obstructive pulmonary disease) (496) (J44 9)   5  Dermatitis (692 9) (L30 9)   6  Hypertension (401 9) (I10)   7  Limb swelling (729 81) (M79 89)   8  Lung nodule seen on imaging study (793 11) (R91 1)   9  Nicotine dependence (305 1) (F17 200)   10  Preop examination (V72 84) (Z01 818)   11  Prostate cancer screening (V76 44) (Z12 5)   12  Pulmonary hypertension (416 8) (I27 2)   13  Screening for skin condition (V82 0) (Z13 89)   14  Tobacco use (305 1) (Z72 0)    Past Medical History    1  History of atopic dermatitis (V13 3) (Z87 2)   2  History of dermatitis (V13 3) (Z87 2)   3  History of Screening for skin condition (V82 0) (Z13 89)    Surgical History    1  History of Cervical Vertebral Fusion   2  History of Hand Incision    Family History  Mother    1  Family history of lung cancer (V16 1) (Z80 1)   2  Family history of Psoriasis  Father    3  Family history of COPD, moderate   4  Family history of lung cancer (V16 1) (Z80 1)  Sister    5  Family history of lung cancer (V16 1) (Z80 1)    Social History    · Alcohol Use (History)   · Consumes alcohol (V49 89) (Z78 9)   · Current Every Day Smoker (305 1)   · Drug Use (305 90)   · Tobacco use (305 1) (Z72 0)    Current Meds    1   Triamcinolone Acetonide 0 1 % External Ointment; APPLY AND GENTLY MASSAGE INTO   AFFECTED AREA(S) TWICE DAILY; Therapy: 63GTA7244 to (Last Rx:07Apr2015)  Requested for: 07Apr2015 Ordered    2  Breo Ellipta 100-25 MCG/INH Inhalation Aerosol Powder Breath Activated; USE 1   INHALATION ONCE DAILY; Therapy: 29ISV4445 to (Evaluate:07Apr2017); Last Rx:25Xqw5957 Ordered   3  Spiriva HandiHaler 18 MCG Inhalation Capsule; INHALE 1 CAPSULE VIA HANDIHALER   EVERY DAY; Therapy: 21RXD9016 to (Nhi Herbert)  Requested for: 19KNK0167; Last   Rx:03Nov2016 Ordered    4  Kenalog 40 MG/ML Injection Suspension (Triamcinolone Acetonide); INJECT 1 ML   INTRAMUSCULARLY ONCE; To Be Done: 92YLI3594; Status: HOLD FOR -   Administration Ordered    5  Lisinopril 20 MG Oral Tablet; TAKE 1 TABLET DAILY; Therapy: 70FHR0551 to ([address] [place] [address] Drive)  Requested for: 43RHS6069; Last   Rx:03Nov2016 Ordered    6  Furosemide 40 MG Oral Tablet (Lasix); take one tablet by mouth daily; Therapy: 27WMA8111 to (Last Rx:03Nov2016)  Requested for: 02HWO4531 Ordered   7  Potassium Chloride ER 20 MEQ Oral Tablet Extended Release; Take 1 tablet daily; Therapy: 44FES2454 to (Last Rx:03Nov2016)  Requested for: 07YSY6565 Ordered    8  ProAir  (90 Base) MCG/ACT Inhalation Aerosol Solution; INHALE TWO PUFFS BY   MOUTH EVERY 4 HOURS AS NEEDED; Therapy: 80CCR5957 to (Evaluate:14Mar2017)  Requested for: 45Vxt7104; Last   Rx:77Fat3903 Ordered    Allergies    1  No Known Drug Allergies    2  Latex    End of Encounter Meds    1  Triamcinolone Acetonide 0 1 % External Ointment; APPLY AND GENTLY MASSAGE INTO   AFFECTED AREA(S) TWICE DAILY; Therapy: 03DGF9271 to (Last Rx:07Apr2015)  Requested for: 07Apr2015 Ordered    2  Breo Ellipta 100-25 MCG/INH Inhalation Aerosol Powder Breath Activated; USE 1   INHALATION ONCE DAILY; Therapy: 05CZW6457 to (Evaluate:07Apr2017); Last Rx:12Yxy2111 Ordered   3  Spiriva HandiHaler 18 MCG Inhalation Capsule; INHALE 1 CAPSULE VIA HANDIHALER   EVERY DAY;    Therapy: 80EHN7759 to (Nhi Herbert)  Requested for: 64GEO9825; Last   Rx:03Nov2016 Ordered    4  Kenalog 40 MG/ML Injection Suspension (Triamcinolone Acetonide); INJECT 1 ML   INTRAMUSCULARLY ONCE; To Be Done: 87MST3211; Status: HOLD FOR -   Administration Ordered    5  Lisinopril 20 MG Oral Tablet; TAKE 1 TABLET DAILY; Therapy: 26SHH9156 to ([address])  Requested for: 35OPB3830; Last   Rx:03Nov2016 Ordered    6  Furosemide 40 MG Oral Tablet (Lasix); take one tablet by mouth daily; Therapy: 57BCH4042 to (Last Rx:03Nov2016)  Requested for: 78HFJ4258 Ordered   7  Potassium Chloride ER 20 MEQ Oral Tablet Extended Release; Take 1 tablet daily; Therapy: 15UYO8715 to (Last Rx:03Nov2016)  Requested for: 90QIF9222 Ordered    8  ProAir  (90 Base) MCG/ACT Inhalation Aerosol Solution; INHALE TWO PUFFS BY   MOUTH EVERY 4 HOURS AS NEEDED; Therapy: 55KAE6766 to (Evaluate:14Mar2017)  Requested for: 18Qsa0998; Last   Rx:00Dde4933 Ordered    Patient Care Team    Care Team Member Role Specialty Office Number   Yue JJ    Dermatology [phone]   [facility]  Pulmonary Medicine [phone]     Signatures   Electronically signed by : Diana Pimentel RN; Jan 13 2017 11:53AM EST                       (Author)

## 2018-01-12 VITALS
BODY MASS INDEX: 24 KG/M2 | RESPIRATION RATE: 16 BRPM | WEIGHT: 158.38 LBS | HEART RATE: 82 BPM | HEIGHT: 68 IN | SYSTOLIC BLOOD PRESSURE: 154 MMHG | DIASTOLIC BLOOD PRESSURE: 92 MMHG

## 2018-01-13 VITALS
DIASTOLIC BLOOD PRESSURE: 84 MMHG | OXYGEN SATURATION: 92 % | BODY MASS INDEX: 26.4 KG/M2 | SYSTOLIC BLOOD PRESSURE: 124 MMHG | WEIGHT: 174.19 LBS | HEART RATE: 119 BPM | HEIGHT: 68 IN

## 2018-01-13 VITALS
HEART RATE: 102 BPM | HEIGHT: 68 IN | DIASTOLIC BLOOD PRESSURE: 64 MMHG | WEIGHT: 160 LBS | BODY MASS INDEX: 24.25 KG/M2 | SYSTOLIC BLOOD PRESSURE: 118 MMHG | OXYGEN SATURATION: 97 %

## 2018-01-14 VITALS
HEIGHT: 68 IN | WEIGHT: 159.25 LBS | RESPIRATION RATE: 16 BRPM | BODY MASS INDEX: 24.13 KG/M2 | HEART RATE: 84 BPM | SYSTOLIC BLOOD PRESSURE: 118 MMHG | DIASTOLIC BLOOD PRESSURE: 80 MMHG

## 2018-01-14 VITALS
HEART RATE: 113 BPM | DIASTOLIC BLOOD PRESSURE: 78 MMHG | OXYGEN SATURATION: 93 % | BODY MASS INDEX: 26.73 KG/M2 | RESPIRATION RATE: 14 BRPM | WEIGHT: 176.38 LBS | HEIGHT: 68 IN | SYSTOLIC BLOOD PRESSURE: 132 MMHG

## 2018-01-15 NOTE — PROGRESS NOTES
History of Present Illness  Care Coordination Encounter Information:   Type of Encounter: Telephonic    Spoke to Other   Voicemail  Care Coordination  Nurse [address] [place] [address] [address]:   The reason for call is to discuss outreach for follow up/needed services and coordination of meeting care plan treatment goals  Attempted to contact patient in regards to status of comorbidities and also in attempted to outreach for needed services  This is second voicemail left; awaiting return call  Active Problems    1  Atopic dermatitis (691 8) (L20 9)   2  Breath shortness (786 05) (R06 02)   3  Colon cancer screening (V76 51) (Z12 11)   4  COPD (chronic obstructive pulmonary disease) (496) (J44 9)   5  Dermatitis (692 9) (L30 9)   6  Hypertension (401 9) (I10)   7  Limb swelling (729 81) (M79 89)   8  Lung nodule seen on imaging study (793 11) (R91 1)   9  Nicotine dependence (305 1) (F17 200)   10  Preop examination (V72 84) (Z01 818)   11  Prostate cancer screening (V76 44) (Z12 5)   12  Pulmonary hypertension (416 8) (I27 2)   13  Screening for skin condition (V82 0) (Z13 89)   14  Tobacco use (305 1) (Z72 0)    Past Medical History    1  History of atopic dermatitis (V13 3) (Z87 2)   2  History of dermatitis (V13 3) (Z87 2)   3  History of Screening for skin condition (V82 0) (Z13 89)    Surgical History    1  History of Cervical Vertebral Fusion   2  History of Hand Incision    Family History  Mother    1  Family history of lung cancer (V16 1) (Z80 1)   2  Family history of Psoriasis  Father    3  Family history of COPD, moderate   4  Family history of lung cancer (V16 1) (Z80 1)  Sister    5  Family history of lung cancer (V16 1) (Z80 1)    Social History    · Alcohol Use (History)   · Consumes alcohol (V49 89) (Z78 9)   · Current Every Day Smoker (305 1)   · Drug Use (305 90)   · Tobacco use (305 1) (Z72 0)    Current Meds    1   Triamcinolone Acetonide 0 1 % External Ointment; APPLY AND GENTLY MASSAGE INTO   AFFECTED AREA(S) TWICE DAILY; Therapy: 97GHB9291 to (Last Rx:07Apr2015)  Requested for: 07Apr2015 Ordered    2  Breo Ellipta 100-25 MCG/INH Inhalation Aerosol Powder Breath Activated; USE 1   INHALATION ONCE DAILY; Therapy: 74BRQ2860 to (Evaluate:07Apr2017); Last Rx:15Foc1315 Ordered   3  Spiriva HandiHaler 18 MCG Inhalation Capsule; INHALE 1 CAPSULE VIA HANDIHALER   EVERY DAY; Therapy: 96GVP2699 to (Celina Nolasco)  Requested for: 39CZT7919; Last   Rx:03Nov2016 Ordered    4  Kenalog 40 MG/ML Injection Suspension (Triamcinolone Acetonide); INJECT 1 ML   INTRAMUSCULARLY ONCE; To Be Done: 31GJQ3004; Status: HOLD FOR -   Administration Ordered    5  Lisinopril 20 MG Oral Tablet; TAKE 1 TABLET DAILY; Therapy: 11MFX8062 to ([address] [place] [address] Drive)  Requested for: 19TDC9560; Last   Rx:03Nov2016 Ordered    6  Furosemide 40 MG Oral Tablet (Lasix); take one tablet by mouth daily; Therapy: 53OKS7453 to (Last Rx:03Nov2016)  Requested for: 42JPA3633 Ordered   7  Potassium Chloride ER 20 MEQ Oral Tablet Extended Release; Take 1 tablet daily; Therapy: 59MAB7888 to (Last Rx:03Nov2016)  Requested for: 64YWC7323 Ordered    8  ProAir  (90 Base) MCG/ACT Inhalation Aerosol Solution; INHALE TWO PUFFS BY   MOUTH EVERY 4 HOURS AS NEEDED; Therapy: 82HXD4632 to (Evaluate:14Mar2017)  Requested for: 40Udd9791; Last   Rx:92Nqn0369 Ordered    Allergies    1  No Known Drug Allergies    2  Latex    End of Encounter Meds    1  Triamcinolone Acetonide 0 1 % External Ointment; APPLY AND GENTLY MASSAGE INTO   AFFECTED AREA(S) TWICE DAILY; Therapy: 07VBU1668 to (Last Rx:07Apr2015)  Requested for: 07Apr2015 Ordered    2  Breo Ellipta 100-25 MCG/INH Inhalation Aerosol Powder Breath Activated; USE 1   INHALATION ONCE DAILY; Therapy: 00KEN5292 to (Evaluate:07Apr2017); Last Rx:56Xad4617 Ordered   3  Spiriva HandiHaler 18 MCG Inhalation Capsule; INHALE 1 CAPSULE VIA HANDIHALER   EVERY DAY;    Therapy: 38EVH3843 to (Celina Nolasco) Requested for: 87FKQ9166; Last   Rx:03Nov2016 Ordered    4  Kenalog 40 MG/ML Injection Suspension (Triamcinolone Acetonide); INJECT 1 ML   INTRAMUSCULARLY ONCE; To Be Done: 95FLO7700; Status: HOLD FOR -   Administration Ordered    5  Lisinopril 20 MG Oral Tablet; TAKE 1 TABLET DAILY; Therapy: 00RRZ0506 to ([address])  Requested for: 42LSA8526; Last   Rx:03Nov2016 Ordered    6  Furosemide 40 MG Oral Tablet (Lasix); take one tablet by mouth daily; Therapy: 98VYB8665 to (Last Rx:03Nov2016)  Requested for: 40NIG6902 Ordered   7  Potassium Chloride ER 20 MEQ Oral Tablet Extended Release; Take 1 tablet daily; Therapy: 58TST5447 to (Last Rx:03Nov2016)  Requested for: 45HFG0460 Ordered    8  ProAir  (90 Base) MCG/ACT Inhalation Aerosol Solution; INHALE TWO PUFFS BY   MOUTH EVERY 4 HOURS AS NEEDED; Therapy: 58UVX6053 to (Evaluate:14Mar2017)  Requested for: 38Jaj4236; Last   Rx:24Uip0634 Ordered    Future Appointments    Date/Time Provider Specialty Site   01/09/2017 04:15 PM Oleg Hensley, [address]  Internal Medicine [facility] [address]   03/13/2017 03:30 PM Myrna Warner [facility] Pulmonary Medicine  [facility]     Patient Care Team    Care Team Member Role Specialty Office Number   Daphne JJ    Dermatology [phone]   Myrna Warner [facility]  Pulmonary Medicine [phone]     Signatures   Electronically signed by : Esthela Hackett RN; Dec 16 2016 10:27AM EST                       (Author)

## 2018-01-17 NOTE — MISCELLANEOUS
Assessment    1  COPD (chronic obstructive pulmonary disease) (496) (J44 9)   2  Hypertension (401 9) (I10)   3  Pulmonary hypertension (416 8) (I27 2)   4  Nicotine dependence (305 1) (F17 200)    Plan  COPD (chronic obstructive pulmonary disease), Hypertension    · (1) CBC/PLT/DIFF; Status:Active; Requested for:13Mar2017;    Perform:[facility]; Due:13Mar2018; Ordered; For:COPD (chronic obstructive pulmonary disease), Hypertension; Ordered By:Ketty Puckett;   · (1) COMPREHENSIVE METABOLIC PANEL; Status:Active; Requested for:13Mar2017;    Perform:[facility]; Due:13Mar2018; Ordered; For:COPD (chronic obstructive pulmonary disease), Hypertension; Ordered By:Ketty Puckett;   · (1) LIPID PANEL, FASTING; Status:Active; Requested for:13Mar2017;    Perform:[facility]; Due:13Mar2018; Ordered; For:COPD (chronic obstructive pulmonary disease), Hypertension; Ordered By:Ketty Puckett;   · (1) TSH; Status:Active; Requested for:13Mar2017;    Perform:[facility]; Due:13Mar2018; Ordered; For:COPD (chronic obstructive pulmonary disease), Hypertension; Ordered By:Ulises Puckett; Nicotine dependence    · We recommend you quit smoking  Time spent counseling today was greater than 3  minutes ; Status:Complete;   Done: 83YJJ2440 09:31AM   Ordered;  For:Nicotine dependence; Ordered By:Ulises Puckett;     Discussion/Summary  Discussion Summary:   Emphysema/pulmonary hypertension markedly improved on steroids, inhaled anticholinergics and low dose furosemide will be following up with pulmonology he will likely need cardiology continue oxygen while ambulating on room air he dropped to 88% consider another 2 weeks of oxygen and then retesting and consider overnight oximetry  Hypertension stable on current medication  Tobacco abuse he is smoke-free since his hospitalization he still has Chantix at home, I discussed risks benefits of medication with the patient that this will help decrease cravings he will consider starting  We'll discuss flu shot at follow-up  follow up 6 weeks       Chief Complaint  Chief Complaint Free Text Note Form: hospital follow up      History of Present Illness  TCM Communication [facility]: The patient is being contacted for follow-up after hospitalization and doing well  He [facility]  The date of discharge: 11/4  Diagnosis: copd  He was discharged to home  Medications reviewed and updated today  He did not schedule a follow up appointment  The patient is currently asymptomatic  Counseling was provided to the patient  Communication performed and completed by Luann Vieira   HPI: feeling better  lost ~ 16 lbs since visit  breathing is better, using o2  He is not smoking but he did not start Chantix yet  He has not followed up with pulmonology he will make appointment today       Review of Systems  Complete-Male:   Constitutional: No fever or chills, feels well, no tiredness, no recent weight gain or weight loss  Eyes: No complaints of eye pain, no red eyes, no discharge from eyes, no itchy eyes  ENT: no complaints of earache, no hearing loss, no nosebleeds, no nasal discharge, no sore throat, no hoarseness  Cardiovascular: No complaints of slow heart rate, no fast heart rate, no chest pain, no palpitations, no leg claudication, no lower extremity  Respiratory: as noted in HPI  Gastrointestinal: No complaints of abdominal pain, no constipation, no nausea or vomiting, no diarrhea or bloody stools  Active Problems    1  Atopic dermatitis (691 8) (L20 9)   2  Breath shortness (786 05) (R06 02)   3  Colon cancer screening (V76 51) (Z12 11)   4  COPD (chronic obstructive pulmonary disease) (496) (J44 9)   5  Dermatitis (692 9) (L30 9)   6  Hypertension (401 9) (I10)   7  Limb swelling (729 81) (M79 89)   8  Nicotine dependence (305 1) (F17 200)   9  Preop examination (V72 84) (Z01 818)   10   Prostate cancer screening (V76 44) (Z12 5)   11  Pulmonary hypertension (416 8) (I27 2)   12  Screening for skin condition (V82 0) (Z13 89)   13  Tobacco use (305 1) (Z72 0)    Past Medical History    1  History of atopic dermatitis (V13 3) (Z87 2)   2  History of dermatitis (V13 3) (Z87 2)   3  History of Screening for skin condition (V82 0) (Z13 89)    Surgical History    1  History of Cervical Vertebral Fusion   2  History of Hand Incision  Surgical History Reviewed: The surgical history was reviewed and updated today  Family History  Mother    1  Family history of lung cancer (V16 1) (Z80 1)   2  Family history of Psoriasis  Father    3  Family history of COPD, moderate   4  Family history of lung cancer (V16 1) (Z80 1)  Sister    5  Family history of lung cancer (V16 1) (Z80 1)  Family History Reviewed: The family history was reviewed and updated today  Social History    · Alcohol Use (History)   · Consumes alcohol (V49 89) (Z78 9)   · Current Every Day Smoker (305 1)   · Drug Use (305 90)   · Tobacco use (305 1) (Z72 0)  Social History Reviewed: The social history was reviewed and updated today  Current Meds   1  Aspirin 325 MG Oral Tablet; TAKE 2 TABLETS EVERY 4 HOURS AS NEEDED; Therapy: (Recorded:03Mar2015) to Recorded   2  Chantix Continuing Month [place] 1 MG Oral Tablet; FOLLOW PACKAGE DIRECTIONS; Therapy: 02FRW8788 to (Last Rx:03Nov2016)  Requested for: 69OYJ5828 Ordered   3  Chantix Starting Month Ford 0 5 MG X 11 & 1 MG X 42 Oral Tablet; TAKE AS DIRECTED PER   PACKAGE INSTRUCTIONS; Therapy: 47SRB1883 to (Last Rx:03Nov2016)  Requested for: 86RDP6827 Ordered   4  Furosemide 40 MG Oral Tablet; take one tablet by mouth daily; Therapy: 39TTS5065 to (Last Rx:03Nov2016)  Requested for: 62RFY1137 Ordered   5  Kenalog 40 MG/ML Injection Suspension; INJECT 1 ML INTRAMUSCULARLY ONCE; To   Be Done: 37VUM2390; Status: HOLD FOR - Administration Ordered   6  Lisinopril 20 MG Oral Tablet; TAKE 1 TABLET DAILY;    Therapy: 11RUC7087 to (Chantel Braulio)  Requested for: 59FPV6851; Last   Rx:03Nov2016 Ordered   7  Potassium Chloride ER 20 MEQ Oral Tablet Extended Release; Take 1 tablet daily; Therapy: 44VOB8118 to (Last Rx:03Nov2016)  Requested for: 50KOO8614 Ordered   8  PredniSONE 10 MG Oral Tablet; 3 po qd x 3, 2 po qd x 3, 1 po qd x 3; Therapy: 74DTZ4627 to (Last Rx:03Nov2016)  Requested for: 53IWD6479 Ordered   9  ProAir  (90 Base) MCG/ACT Inhalation Aerosol Solution; INHALE TWO PUFFS BY   MOUTH EVERY 4 HOURS AS NEEDED; Therapy: 98UVY7862 to (Evaluate:12Nov2016)  Requested for: 26Oct2016; Last   Rx:26Oct2016 Ordered   10  Spiriva HandiHaler 18 MCG Inhalation Capsule; INHALE 1 CAPSULE VIA HANDIHALER    EVERY DAY; Therapy: 38GUO4159 to (Pleasant Jose M)  Requested for: 42SWC8572; Last    Rx:03Nov2016 Ordered   11  Triamcinolone Acetonide 0 1 % External Ointment; APPLY AND GENTLY MASSAGE INTO    AFFECTED AREA(S) TWICE DAILY; Therapy: 78YGV4600 to (Last Rx:07Apr2015)  Requested for: 75Goz8240 Ordered  Medication List Reviewed: The medication list was reviewed and updated today  Allergies    1  No Known Drug Allergies    2  Latex    Physical Exam    Additional Exam:  02 at rest 94 % w/out o2 02 while ambulating 88-91 88 at recovery          Signatures   Electronically signed by : Coit [place], [address]; Nov 8 2016  9:34AM EST                       (Author)    Electronically signed by : JACEY Rueda ; Nov 8 2016 11:26AM EST

## 2018-01-17 NOTE — PROGRESS NOTES
History of Present Illness  Care Coordination Encounter Information:   Type of Encounter: Telephonic    Spoke to Other   [facility]  Care Coordination  Nurse [address]:   The reason for call is to discuss outreach for follow up/needed services and coordination of meeting care plan treatment goals  Attempted to reach patient in regards to s/p discharge on 11/3/16 with diagnosis of COPD exacerbation  Voicemail left; awaiting return call  Active Problems    1  Atopic dermatitis (691 8) (L20 9)   2  Breath shortness (786 05) (R06 02)   3  Colon cancer screening (V76 51) (Z12 11)   4  COPD (chronic obstructive pulmonary disease) (496) (J44 9)   5  Dermatitis (692 9) (L30 9)   6  Hypertension (401 9) (I10)   7  Limb swelling (729 81) (M79 89)   8  Nicotine dependence (305 1) (F17 200)   9  Preop examination (V72 84) (Z01 818)   10  Prostate cancer screening (V76 44) (Z12 5)   11  Pulmonary hypertension (416 8) (I27 2)   12  Screening for skin condition (V82 0) (Z13 89)   13  Tobacco use (305 1) (Z72 0)    Past Medical History    1  History of atopic dermatitis (V13 3) (Z87 2)   2  History of dermatitis (V13 3) (Z87 2)   3  History of Screening for skin condition (V82 0) (Z13 89)    Surgical History    1  History of Cervical Vertebral Fusion   2  History of Hand Incision    Family History  Mother    1  Family history of lung cancer (V16 1) (Z80 1)   2  Family history of Psoriasis  Father    3  Family history of COPD, moderate   4  Family history of lung cancer (V16 1) (Z80 1)  Sister    5  Family history of lung cancer (V16 1) (Z80 1)    Social History    · Alcohol Use (History)   · Consumes alcohol (V49 89) (Z78 9)   · Current Every Day Smoker (305 1)   · Drug Use (305 90)   · Tobacco use (305 1) (Z72 0)    Current Meds    1  Triamcinolone Acetonide 0 1 % External Ointment; APPLY AND GENTLY MASSAGE INTO   AFFECTED AREA(S) TWICE DAILY;    Therapy: 39DOQ1240 to (Last Rx:07Apr2015)  Requested for: [phone] Ordered    2  PredniSONE 10 MG Oral Tablet; 3 po qd x 3, 2 po qd x 3, 1 po qd x 3; Therapy: 89ALB2331 to (Last Rx:03Nov2016)  Requested for: 45LVF0887 Ordered   3  Spiriva HandiHaler 18 MCG Inhalation Capsule; INHALE 1 CAPSULE VIA HANDIHALER   EVERY DAY; Therapy: 35XRE9138 to (Amandeep Gloria)  Requested for: 34NMY6425; Last   Rx:03Nov2016 Ordered    4  Kenalog 40 MG/ML Injection Suspension (Triamcinolone Acetonide); INJECT 1 ML   INTRAMUSCULARLY ONCE; To Be Done: 41LPU8643; Status: HOLD FOR -   Administration Ordered    5  Lisinopril 20 MG Oral Tablet; TAKE 1 TABLET DAILY; Therapy: 43YKG2084 to ([address] [place] [address] Drive)  Requested for: 18WLU9947; Last   Rx:03Nov2016 Ordered    6  Furosemide 40 MG Oral Tablet (Lasix); take one tablet by mouth daily; Therapy: 79ARO9336 to (Last Rx:03Nov2016)  Requested for: 84YCV3643 Ordered   7  Potassium Chloride ER 20 MEQ Oral Tablet Extended Release; Take 1 tablet daily; Therapy: 52YEG0734 to (Last Rx:03Nov2016)  Requested for: 65MMO4749 Ordered    8  Chantix Continuing Month Ford 1 MG Oral Tablet; FOLLOW PACKAGE DIRECTIONS; Therapy: 92FWG5214 to (Last Rx:03Nov2016)  Requested for: 00HQL5507 Ordered   9  Chantix Starting Month Ford 0 5 MG X 11 & 1 MG X 42 Oral Tablet; TAKE AS DIRECTED PER   PACKAGE INSTRUCTIONS; Therapy: 69MNN5885 to (Last Rx:03Nov2016)  Requested for: 33UZW8252 Ordered   10  ProAir  (90 Base) MCG/ACT Inhalation Aerosol Solution; INHALE TWO PUFFS BY    MOUTH EVERY 4 HOURS AS NEEDED; Therapy: 23EFG2405 to (Evaluate:12Nov2016)  Requested for: 26Oct2016; Last    Rx:26Oct2016 Ordered    Allergies    1  No Known Drug Allergies    2  Latex    End of Encounter Meds    1  Triamcinolone Acetonide 0 1 % External Ointment; APPLY AND GENTLY MASSAGE INTO   AFFECTED AREA(S) TWICE DAILY; Therapy: 43PWA5748 to (Last Rx:07Apr2015)  Requested for: 73Ebo9959 Ordered    2  PredniSONE 10 MG Oral Tablet; 3 po qd x 3, 2 po qd x 3, 1 po qd x 3;    Therapy: 42JZL9388 to (Last Rx:03Nov2016)  Requested for: 67RPJ9810 Ordered   3  Spiriva HandiHaler 18 MCG Inhalation Capsule; INHALE 1 CAPSULE VIA HANDIHALER   EVERY DAY; Therapy: 57ZDK1833 to (Bharat Najera)  Requested for: 33AQT2429; Last   Rx:03Nov2016 Ordered    4  Kenalog 40 MG/ML Injection Suspension (Triamcinolone Acetonide); INJECT 1 ML   INTRAMUSCULARLY ONCE; To Be Done: 88KHC5034; Status: HOLD FOR -   Administration Ordered    5  Lisinopril 20 MG Oral Tablet; TAKE 1 TABLET DAILY; Therapy: 65WAY0966 to (Chris Face)  Requested for: 72VPW6184; Last   Rx:03Nov2016 Ordered    6  Furosemide 40 MG Oral Tablet (Lasix); take one tablet by mouth daily; Therapy: 67STC8947 to (Last Rx:03Nov2016)  Requested for: 28JRG5675 Ordered   7  Potassium Chloride ER 20 MEQ Oral Tablet Extended Release; Take 1 tablet daily; Therapy: 36LAY6695 to (Last Rx:03Nov2016)  Requested for: 33HJU5519 Ordered    8  Chantix Continuing Month Ford 1 MG Oral Tablet; FOLLOW PACKAGE DIRECTIONS; Therapy: 41UPL1202 to (Last Rx:03Nov2016)  Requested for: 94SEA2780 Ordered   9  Chantix Starting Month Ford 0 5 MG X 11 & 1 MG X 42 Oral Tablet; TAKE AS DIRECTED PER   PACKAGE INSTRUCTIONS; Therapy: 86WVX4561 to (Last Rx:03Nov2016)  Requested for: 67NCF2587 Ordered   10  ProAir  (90 Base) MCG/ACT Inhalation Aerosol Solution; INHALE TWO PUFFS BY    MOUTH EVERY 4 HOURS AS NEEDED; Therapy: 36KRU3669 to (Evaluate:12Nov2016)  Requested for: 92CBY4967; Last    Rx:26Oct2016 Ordered    Future Appointments    Date/Time Provider Specialty Site   12/08/2016 09:30 AM Trevon Walker  Internal Medicine [facility]   12/08/2016 10:30 AM Maggy [facility] Pulmonary Medicine  [facility]     Patient Care Team    Care Team Member Role Specialty Office Number   Jinx Dam M АННА    Dermatology [phone]   [facility]  [facility] [phone]     Signatures   Electronically signed by : Sandra Kuo RN; Nov 18 2016 10:22AM EST                       (Author)

## 2018-01-22 VITALS
SYSTOLIC BLOOD PRESSURE: 122 MMHG | OXYGEN SATURATION: 93 % | DIASTOLIC BLOOD PRESSURE: 64 MMHG | BODY MASS INDEX: 24.44 KG/M2 | WEIGHT: 161.25 LBS | HEART RATE: 92 BPM | HEIGHT: 68 IN

## 2018-02-01 DIAGNOSIS — I10 ESSENTIAL (PRIMARY) HYPERTENSION: ICD-10-CM

## 2018-02-16 ENCOUNTER — OFFICE VISIT (OUTPATIENT)
Dept: INTERNAL MEDICINE CLINIC | Facility: CLINIC | Age: 63
End: 2018-02-16
Payer: COMMERCIAL

## 2018-02-16 VITALS
BODY MASS INDEX: 24.64 KG/M2 | SYSTOLIC BLOOD PRESSURE: 120 MMHG | WEIGHT: 162.6 LBS | DIASTOLIC BLOOD PRESSURE: 70 MMHG | HEART RATE: 102 BPM | HEIGHT: 68 IN

## 2018-02-16 DIAGNOSIS — I27.20 PULMONARY HYPERTENSION (HCC): Primary | ICD-10-CM

## 2018-02-16 DIAGNOSIS — E78.49 OTHER HYPERLIPIDEMIA: ICD-10-CM

## 2018-02-16 DIAGNOSIS — J44.1 COPD EXACERBATION (HCC): ICD-10-CM

## 2018-02-16 PROCEDURE — 3008F BODY MASS INDEX DOCD: CPT | Performed by: NURSE PRACTITIONER

## 2018-02-16 PROCEDURE — 99214 OFFICE O/P EST MOD 30 MIN: CPT | Performed by: NURSE PRACTITIONER

## 2018-02-16 RX ORDER — IPRATROPIUM BROMIDE AND ALBUTEROL SULFATE 2.5; .5 MG/3ML; MG/3ML
3 SOLUTION RESPIRATORY (INHALATION)
Qty: 180 ML | Refills: 0 | Status: SHIPPED | OUTPATIENT
Start: 2018-02-16

## 2018-02-16 RX ORDER — IPRATROPIUM BROMIDE AND ALBUTEROL SULFATE 2.5; .5 MG/3ML; MG/3ML
1 SOLUTION RESPIRATORY (INHALATION) EVERY 4 HOURS PRN
COMMUNITY
Start: 2017-10-24 | End: 2018-02-16 | Stop reason: SDUPTHER

## 2018-02-16 RX ORDER — LISINOPRIL 20 MG/1
1 TABLET ORAL DAILY
COMMUNITY
Start: 2016-11-03

## 2018-02-16 RX ORDER — PREDNISONE 10 MG/1
TABLET ORAL
Qty: 18 TABLET | Refills: 0 | Status: SHIPPED | OUTPATIENT
Start: 2018-02-16

## 2018-02-16 NOTE — PATIENT INSTRUCTIONS
Exacerbation of COPD secondary to environmental exposure    She with prednisone contact us if not improving

## 2018-02-16 NOTE — PROGRESS NOTES
Assessment/Plan:    Exacerbation of COPD secondary to environmental exposure  She with prednisone contact us if not improving       Diagnoses and all orders for this visit:    Pulmonary hypertension  -     CBC and differential; Future  -     Comprehensive metabolic panel; Future    COPD exacerbation (HCC)  -     predniSONE 10 mg tablet; Take 3 tablets for 3 days then 2 tablets for 3 days then 1 tablet for 3 days  -     ipratropium-albuterol (DUO-NEB) 0 5-2 5 mg/3 mL; Take 3 mL by nebulization every 4 (four) hours while awake    Other hyperlipidemia  -     Lipid panel; Future  -     TSH, 3rd generation; Future    Other orders  -     lisinopril (ZESTRIL) 20 mg tablet; Take 1 tablet by mouth daily  -     Discontinue: ipratropium-albuterol (DUO-NEB) 0 5-2 5 mg/3 mL; Inhale 1 each every 4 (four) hours as needed          Subjective:      Patient ID: Kingsley Espinal is a 66 y o  male  Patient was at work and he was doing something with a torch and burning where it would and he was inhaling all those fumes for an entire shift  Now he has shortness of breath cough and wheeze no fevers chills        The following portions of the patient's history were reviewed and updated as appropriate: allergies, current medications, past family history, past medical history, past social history, past surgical history and problem list     Review of Systems   Constitutional: Negative for activity change, chills and fatigue  HENT: Positive for congestion  Negative for ear pain, postnasal drip, sinus pain and sinus pressure  Respiratory: Positive for cough, shortness of breath and wheezing  Cardiovascular: Negative for chest pain  Gastrointestinal: Negative for abdominal pain and nausea  Musculoskeletal: Negative for arthralgias  Neurological: Negative for headaches  Objective:    Vitals:    02/16/18 0905   BP: 120/70   Pulse: 102        Physical Exam   Constitutional: He is oriented to person, place, and time   He appears well-developed  No distress  HENT:   Head: Normocephalic and atraumatic  Right Ear: A middle ear effusion is present  Left Ear: A middle ear effusion is present  Nose: Mucosal edema present  Right sinus exhibits maxillary sinus tenderness  Left sinus exhibits maxillary sinus tenderness  Mouth/Throat: Oropharynx is clear and moist and mucous membranes are normal    Eyes: Right eye exhibits no discharge  Left eye exhibits no discharge  No scleral icterus  Neck: Normal range of motion  Neck supple  No JVD present  No tracheal deviation present  No thyromegaly present  Cardiovascular: Normal rate, regular rhythm, normal heart sounds and intact distal pulses  Exam reveals no gallop and no friction rub  No murmur heard  Pulmonary/Chest: No respiratory distress  He has wheezes  He has no rales  Abdominal: Soft  Bowel sounds are normal  He exhibits no distension  There is no tenderness  Musculoskeletal: Normal range of motion  He exhibits no edema  Lymphadenopathy:     He has no cervical adenopathy  Neurological: He is alert and oriented to person, place, and time  He has normal reflexes  No cranial nerve deficit  Skin: Skin is warm and dry  No rash noted  He is not diaphoretic  Psychiatric: He has a normal mood and affect   His behavior is normal  Judgment and thought content normal

## 2018-03-07 NOTE — PROGRESS NOTES
January 13, 2017  To: Charline Collins From: [facility]  Dear Mr Gm Bland am a Registered Nurse,  Care Coordinator at [facility]  Your health is very important to us  I attempted to contact you multiple times without success  I am writing you this letter to offer my services to you if you may have any questions or concerns  regarding your health   If you have any questions or concerns please feel free to give me a call; [phone] Monday-Friday 8am-4:30pm     Kind regards,  [facility]        Electronically signed by:Anahi Peñaloza RN  Jan 13 2017 12:01PM EST

## 2018-03-28 ENCOUNTER — TELEPHONE (OUTPATIENT)
Dept: INTERNAL MEDICINE CLINIC | Facility: CLINIC | Age: 63
End: 2018-03-28

## 2018-03-28 NOTE — TELEPHONE ENCOUNTER
Stone Herr from [facility] requesting information on this pt who is now   He stated he is investigating his death and needs info on this pt

## 2018-04-03 NOTE — TELEPHONE ENCOUNTER
I have not seen him in over a year, Sukhwinder Castillo saw him 5 months ago for a COPD exacerbation  I do not really have anything useful to add to their investigation  He did have very severe lung disease and smoked most of his life

## 2018-04-03 NOTE — TELEPHONE ENCOUNTER
GIRMA GRIGSBY CALLED BACK    WOULD LIKE TO DISCUSS PT'S MEDICAL HISTORY  AND KNOW IF PT'S PASSING WAS AN EXPECTED OR UNEXPECTED OUTCOME    CALL BACK ON HIS CELLPHONE [phone]

## 2018-04-04 NOTE — TELEPHONE ENCOUNTER
Stone yeung calling back  Would like to speak to dr Meagan Lara in regards to patient  Please call him on his cell [phone]

## 2018-07-18 DIAGNOSIS — R91.1 PULMONARY NODULE: Primary | ICD-10-CM

## 2020-02-12 ENCOUNTER — TELEPHONE (OUTPATIENT)
Dept: INTERNAL MEDICINE CLINIC | Facility: CLINIC | Age: 65
End: 2020-02-12